# Patient Record
Sex: FEMALE
[De-identification: names, ages, dates, MRNs, and addresses within clinical notes are randomized per-mention and may not be internally consistent; named-entity substitution may affect disease eponyms.]

---

## 2019-01-11 ENCOUNTER — HOSPITAL ENCOUNTER (INPATIENT)
Dept: HOSPITAL 5 - ED | Age: 49
LOS: 2 days | Discharge: HOME | DRG: 343 | End: 2019-01-13
Attending: INTERNAL MEDICINE | Admitting: INTERNAL MEDICINE
Payer: COMMERCIAL

## 2019-01-11 DIAGNOSIS — K35.80: Primary | ICD-10-CM

## 2019-01-11 DIAGNOSIS — Z83.3: ICD-10-CM

## 2019-01-11 DIAGNOSIS — F10.10: ICD-10-CM

## 2019-01-11 DIAGNOSIS — Z79.84: ICD-10-CM

## 2019-01-11 DIAGNOSIS — E11.9: ICD-10-CM

## 2019-01-11 DIAGNOSIS — F17.210: ICD-10-CM

## 2019-01-11 DIAGNOSIS — Z71.6: ICD-10-CM
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
## 2019-01-11 LAB
ALBUMIN SERPL-MCNC: 4.6 G/DL (ref 3.9–5)
ALT SERPL-CCNC: 16 UNITS/L (ref 7–56)
BASOPHILS # (AUTO): 0.1 K/MM3 (ref 0–0.1)
BASOPHILS NFR BLD AUTO: 1 % (ref 0–1.8)
BILIRUB UR QL STRIP: (no result)
BLOOD UR QL VISUAL: (no result)
BUN SERPL-MCNC: 15 MG/DL (ref 7–17)
BUN/CREAT SERPL: 17 %
CALCIUM SERPL-MCNC: 10.1 MG/DL (ref 8.4–10.2)
EOSINOPHIL # BLD AUTO: 0.2 K/MM3 (ref 0–0.4)
EOSINOPHIL NFR BLD AUTO: 1.7 % (ref 0–4.3)
HCT VFR BLD CALC: 47.1 % (ref 30.3–42.9)
HEMOLYSIS INDEX: 5
HGB BLD-MCNC: 16.5 GM/DL (ref 10.1–14.3)
LYMPHOCYTES # BLD AUTO: 3.2 K/MM3 (ref 1.2–5.4)
LYMPHOCYTES NFR BLD AUTO: 32.9 % (ref 13.4–35)
MCHC RBC AUTO-ENTMCNC: 35 % (ref 30–34)
MCV RBC AUTO: 91 FL (ref 79–97)
MONOCYTES # (AUTO): 0.6 K/MM3 (ref 0–0.8)
MONOCYTES % (AUTO): 6.5 % (ref 0–7.3)
MUCOUS THREADS #/AREA URNS HPF: (no result) /HPF
PH UR STRIP: 8 [PH] (ref 5–7)
PLATELET # BLD: 255 K/MM3 (ref 140–440)
PROT UR STRIP-MCNC: (no result) MG/DL
RBC # BLD AUTO: 5.15 M/MM3 (ref 3.65–5.03)
RBC #/AREA URNS HPF: 4 /HPF (ref 0–6)
UROBILINOGEN UR-MCNC: 2 MG/DL (ref ?–2)
WBC #/AREA URNS HPF: 3 /HPF (ref 0–6)

## 2019-01-11 PROCEDURE — 88304 TISSUE EXAM BY PATHOLOGIST: CPT

## 2019-01-11 PROCEDURE — 85025 COMPLETE CBC W/AUTO DIFF WBC: CPT

## 2019-01-11 PROCEDURE — 96375 TX/PRO/DX INJ NEW DRUG ADDON: CPT

## 2019-01-11 PROCEDURE — 99406 BEHAV CHNG SMOKING 3-10 MIN: CPT

## 2019-01-11 PROCEDURE — 80053 COMPREHEN METABOLIC PANEL: CPT

## 2019-01-11 PROCEDURE — 80048 BASIC METABOLIC PNL TOTAL CA: CPT

## 2019-01-11 PROCEDURE — 96374 THER/PROPH/DIAG INJ IV PUSH: CPT

## 2019-01-11 PROCEDURE — 74177 CT ABD & PELVIS W/CONTRAST: CPT

## 2019-01-11 PROCEDURE — 82962 GLUCOSE BLOOD TEST: CPT

## 2019-01-11 PROCEDURE — 84703 CHORIONIC GONADOTROPIN ASSAY: CPT

## 2019-01-11 PROCEDURE — 81001 URINALYSIS AUTO W/SCOPE: CPT

## 2019-01-11 PROCEDURE — 36415 COLL VENOUS BLD VENIPUNCTURE: CPT

## 2019-01-11 PROCEDURE — 83690 ASSAY OF LIPASE: CPT

## 2019-01-11 NOTE — EMERGENCY DEPARTMENT REPORT
ED Abdominal Pain HPI





- General


Chief Complaint: Abdominal Pain


Stated Complaint: ABD PAIN/VOMITING


Time Seen by Provider: 01/11/19 22:01


Source: patient


Mode of arrival: Ambulatory


Limitations: No Limitations





- History of Present Illness


Initial Comments: 


Patient is a 48-year-old female that presents to emergency room with complaints 

of abdominal pain 3 days.  Patient states she's had this problem for 1 month 

and a half intermittently.  Patient states the pain is a 5 out of 10 and has 

been worsening over the last 3 days.  Patient states the pain is better with 

rest and worse with palpation and movement.  Patient states the pain is in her 

umbilicus and radiates to her right lower quadrant and radiates to her right 

flank.  Patient states she is having nausea and vomiting.  Patient states 

several normal bowel movements.





MD Complaint: abdominal pain


-: Sudden


Location: periumbilical, RLQ


Radiation: R flank


Migration to: no migration


Severity: moderate


Severity scale (0 -10): 5


Quality: stabbing


Consistency: constant


Improves With: rest


Worsens With: movement


Associated Symptoms: nausea, vomiting.  denies: diarrhea, fever, chills, 

constipation, dysuria, hematemesis, hematochezia, melena, hematuria, anorexia, 

syncope


Treatments Prior to Arrival: NSAIDs





- Related Data


LMP (females 10-50): last week


                                    Allergies











Allergy/AdvReac Type Severity Reaction Status Date / Time


 


No Known Allergies Allergy   Unverified 01/11/19 17:55














ED Review of Systems


ROS: 


Stated complaint: ABD PAIN/VOMITING


Other details as noted in HPI





Constitutional: denies: chills, fever


Eyes: denies: eye pain, eye discharge, vision change


ENT: denies: ear pain, throat pain


Respiratory: denies: cough, shortness of breath, wheezing


Cardiovascular: denies: chest pain, palpitations


Endocrine: no symptoms reported


Gastrointestinal: abdominal pain, nausea, vomiting.  denies: diarrhea


Genitourinary: denies: urgency, dysuria, discharge


Musculoskeletal: denies: back pain, joint swelling, arthralgia


Skin: denies: rash, lesions


Neurological: denies: headache, weakness, paresthesias


Psychiatric: denies: anxiety, depression


Hematological/Lymphatic: denies: easy bleeding, easy bruising





ED Past Medical Hx





- Past Medical History


Previous Medical History?: No





- Surgical History


Past Surgical History?: Yes


Additional Surgical History: C/S





- Family History


Family history: no significant





- Social History


Smoking Status: Current Every Day Smoker


Substance Use Type: None





ED Physical Exam





- General


Limitations: No Limitations


General appearance: alert, in no apparent distress





- Head


Head exam: Present: atraumatic, normocephalic





- Eye


Eye exam: Present: normal appearance





- ENT


ENT exam: Present: mucous membranes moist





- Neck


Neck exam: Present: normal inspection





- Respiratory


Respiratory exam: Present: normal lung sounds bilaterally.  Absent: respiratory 

distress





- Cardiovascular


Cardiovascular Exam: Present: regular rate, normal rhythm.  Absent: systolic 

murmur, diastolic murmur, rubs, gallop





- GI/Abdominal


GI/Abdominal exam: Present: soft, tenderness (periumbilical tenderness and right

lower quadrant tenderness.  Right flank tenderness), normal bowel sounds





- Extremities Exam


Extremities exam: Present: normal inspection





- Back Exam


Back exam: Present: normal inspection





- Neurological Exam


Neurological exam: Present: alert, oriented X3





- Psychiatric


Psychiatric exam: Present: normal affect, normal mood





- Skin


Skin exam: Present: warm, dry, intact, normal color.  Absent: rash





ED Course


                                   Vital Signs











  01/11/19 01/11/19 01/11/19





  17:55 21:38 23:55


 


Temperature 98.5 F  


 


Pulse Rate 74 74 62


 


Respiratory 20 16 16





Rate   


 


Blood Pressure 140/87  


 


Blood Pressure  134/62 126/81





[Left]   


 


O2 Sat by Pulse 98 97 97





Oximetry   














  01/12/19 01/12/19 01/12/19





  00:55 01:55 03:27


 


Temperature   97.8 F


 


Pulse Rate 61 67 61


 


Respiratory 18 16 16





Rate   


 


Blood Pressure   


 


Blood Pressure 106/53 121/74 121/76





[Left]   


 


O2 Sat by Pulse 98 98 97





Oximetry   














- Reevaluation(s)


Reevaluation #1: 


Discussed all results with patient. patient agrees with plan of care and 

admission.  Patient to be admitted to the hospitalist service.


01/12/19 03:15








- Consultations


Consultation #1: 








Discussed case with Dr. boucher.  Dr. boucher, general surgery wants patient 

admitted to the hospitalist service and placed on Zosyn and nothing by mouth


01/12/19 03:16








Hospitalist consult for admission.  Hospitalist to admit patient.


01/12/19 03:49














ED Medical Decision Making





- Lab Data


Result diagrams: 


                                 01/11/19 18:06





                                 01/11/19 18:06





- Radiology Data


Radiology results: report reviewed





FINAL REPORT 





 PROCEDURE: CT ABDOMEN PELVIS W CON 





 TECHNIQUE: Computerized axial tomography of the abdomen and pelvis was 

performed after the IV 


 injection of iodinated nonionic contrast. 





 HISTORY: rt flank pain. back pain,. rlq pain 





 COMPARISON: No prior studies are available for comparison. 





 FINDINGS: 


 Visualized lower thorax: No significant abnormality. 





 Liver: Normal size and attenuation. 





 Spleen: Normal size and attenuation. 





 Gallbladder and biliary system: Normal. 





 Pancreas: Normal. 





 Adrenals: Normal. 





 Kidneys: Normal. 





 GI tract: The appendix is identified and measures 7 millimeters in diameter. 

This is at the upper 


 limits of normal. There are questionable inflammatory changes of the 

surrounding fat. Early 


 appendicitis is suspected. Correlation with clinical data suggested. 





 The stomach, small bowel and colon are unremarkable.. 





 Lymph nodes and mesentery: Normal. 





 Vasculature: Normal. 





 Bladder: Normal. 





 Reproductive organs: Uterus and ovaries are unremarkable.. 





 Peritoneum: There is no ascites or free air, abscess or adenopathy.. 





 Musculoskeletal structures: No significant abnormality. 





 Other: None. 





 IMPRESSION: 


 The appendix is identified and measures 7 millimeters in diameter. This is at 

the upper limits of 


 normal. There are questionable inflammatory changes of the surrounding fat. 

Early appendicitis is 


 suspected. Correlation with clinical data suggested. 





 The stomach, small bowel and colon are unremarkable.. 





 Uterus and ovaries are unremarkable.. 





 There is no ascites or free air, abscess or adenopathy. 











- Medical Decision Making


Patient is a 48-year-old female that presents emergency room with complaints of 

right lower quadrant pain.  Patient found on CT scan to have a acute 

appendicitis.  Gen. surgery consult.  Patient admitted to the hospitalist 

service.








- Differential Diagnosis


appendicitis.  Abdominal pain


Critical Care Time: Yes


Critical care attestation.: 


If time is entered above; I have spent that time in minutes in the direct care 

of this critically ill patient, excluding procedure time.





Critical Care Time: 





35 minutes





ED Disposition


Clinical Impression: 


Appendicitis


Qualifiers:


 Appendicitis type: acute appendicitis Acute appendicitis type: unspecified 

acute appendicitis type Qualified Code(s): K35.80 - Unspecified acute 

appendicitis





Abdominal pain


Qualifiers:


 Abdominal location: right lower quadrant Qualified Code(s): R10.31 - Right lowe

r quadrant pain





Disposition: DC-09 OP ADMIT IP TO THIS HOSP


Is pt being admited?: Yes


Does the pt Need Aspirin: No


Condition: Critical


Time of Disposition: 03:46

## 2019-01-12 PROCEDURE — 0DTJ4ZZ RESECTION OF APPENDIX, PERCUTANEOUS ENDOSCOPIC APPROACH: ICD-10-PCS | Performed by: SURGERY

## 2019-01-12 RX ADMIN — SODIUM CHLORIDE SCH MLS/HR: 0.9 INJECTION, SOLUTION INTRAVENOUS at 17:28

## 2019-01-12 RX ADMIN — HYDROMORPHONE HYDROCHLORIDE PRN MG: 1 INJECTION, SOLUTION INTRAMUSCULAR; INTRAVENOUS; SUBCUTANEOUS at 14:45

## 2019-01-12 RX ADMIN — SODIUM CHLORIDE SCH MLS/HR: 0.9 INJECTION, SOLUTION INTRAVENOUS at 17:30

## 2019-01-12 RX ADMIN — HYDROMORPHONE HYDROCHLORIDE PRN MG: 1 INJECTION, SOLUTION INTRAMUSCULAR; INTRAVENOUS; SUBCUTANEOUS at 16:00

## 2019-01-12 RX ADMIN — HYDROCODONE BITARTRATE AND ACETAMINOPHEN PRN EACH: 5; 325 TABLET ORAL at 23:42

## 2019-01-12 NOTE — CONSULTATION
History of Present Illness


Consult date: 19


Chief complaint: 





ABDOMINAL PAIN





- History of present illness


History of present illness: 





47 yo F with hx of prediabetes presents to hospital with c/o 3 days of 

periumbilical abdominal pain radiating to the right lower quadrant and right 

lower back. Pain is sharp, started suddenly and is not specifically related to 

food. It waxes and wanes in severity but is otherwise constant. She had 3 

episodes of nonbloody/nonbilious emesis today. + loose stools. Low grade temp at

home of 100. She has never had pain like this in the past. She also c/o RUQ pain

from time to time after eating meals, especially at night after dinner. This 

pain is different from what she is experiencing today. She has never had a 

colonoscopy and has not seen a doctor in 5 years.





Past History


Past Medical History: other (prediabetes)


Past Surgical History: 


Social history: smoking (1/2 PPD), alcohol abuse (occasional).  denies: 

prescription drug abuse, IV drug use


Family history: diabetes





Medications and Allergies


                                    Allergies











Allergy/AdvReac Type Severity Reaction Status Date / Time


 


No Known Allergies Allergy   Unverified 19 17:55











Active Meds: 


Active Medications





Sodium Chloride (Nacl 0.9% 1000 Ml)  1,000 mls @ 100 mls/hr IV AS DIRECT EVETTE


Piperacillin Sod/Tazobactam Sod (Zosyn/Ns 4.5gm/100ml)  4.5 gm in 100 mls @ 200 

mls/hr IV Q8H EVETTE; Protocol











Review of Systems


All systems: negative (10 PT ROS performed and negative except for that listed 

in HPI)





Exam


                                   Vital Signs











Temp Pulse Resp BP Pulse Ox


 


 98.5 F   74   20   140/87   98 


 


 19 17:55  19 17:55  19 17:55  19 17:55  19 17:55











Narrative exam: 





Gen: AAOx3. NAD


ENT: no scleral icterus or conjunctival pallor


CV: S1, S2+


resp: even and unlabored


Abd: soft, ND, + RLQ and RUQ TTP. no r/r/g


Ext; no c/c/e





Results





- Labs





                                 19 18:06





                                 19 18:06


                              Abnormal lab results











  19 Range/Units





  18:06 18:06 18:29 


 


RBC  5.15 H    (3.65-5.03)  M/mm3


 


Hgb  16.5 H    (10.1-14.3)  gm/dl


 


Hct  47.1 H    (30.3-42.9)  %


 


MCHC  35 H    (30-34)  %


 


RDW  13.0 L    (13.2-15.2)  %


 


Glucose   209 H   ()  mg/dL


 


Urine pH    8.0 H  (5.0-7.0)  








                                 Diabetes panel











  19 Range/Units





  18:06 


 


Sodium  139  (137-145)  mmol/L


 


Potassium  4.1  (3.6-5.0)  mmol/L


 


Chloride  98.8  ()  mmol/L


 


Carbon Dioxide  29  (22-30)  mmol/L


 


BUN  15  (7-17)  mg/dL


 


Creatinine  0.9  (0.7-1.2)  mg/dL


 


Glucose  209 H  ()  mg/dL


 


Calcium  10.1  (8.4-10.2)  mg/dL


 


AST  14  (5-40)  units/L


 


ALT  16  (7-56)  units/L


 


Alkaline Phosphatase  121  ()  units/L


 


Total Protein  7.6  (6.3-8.2)  g/dL


 


Albumin  4.6  (3.9-5)  g/dL








                                  Calcium panel











  19 Range/Units





  18:06 


 


Calcium  10.1  (8.4-10.2)  mg/dL


 


Albumin  4.6  (3.9-5)  g/dL








                                 Pituitary panel











  19 Range/Units





  18:06 


 


Sodium  139  (137-145)  mmol/L


 


Potassium  4.1  (3.6-5.0)  mmol/L


 


Chloride  98.8  ()  mmol/L


 


Carbon Dioxide  29  (22-30)  mmol/L


 


BUN  15  (7-17)  mg/dL


 


Creatinine  0.9  (0.7-1.2)  mg/dL


 


Glucose  209 H  ()  mg/dL


 


Calcium  10.1  (8.4-10.2)  mg/dL








                                  Adrenal panel











  19 Range/Units





  18:06 


 


Sodium  139  (137-145)  mmol/L


 


Potassium  4.1  (3.6-5.0)  mmol/L


 


Chloride  98.8  ()  mmol/L


 


Carbon Dioxide  29  (22-30)  mmol/L


 


BUN  15  (7-17)  mg/dL


 


Creatinine  0.9  (0.7-1.2)  mg/dL


 


Glucose  209 H  ()  mg/dL


 


Calcium  10.1  (8.4-10.2)  mg/dL


 


Total Bilirubin  0.40  (0.1-1.2)  mg/dL


 


AST  14  (5-40)  units/L


 


ALT  16  (7-56)  units/L


 


Alkaline Phosphatase  121  ()  units/L


 


Total Protein  7.6  (6.3-8.2)  g/dL


 


Albumin  4.6  (3.9-5)  g/dL














- Imaging


CT scan - abdomen: report reviewed, image reviewed





Assessment and Plan





47 yo F with acute appendicitis





PLan:





1. NPO


2. IVF


3. IV zosyn


4. prn pain and nausea control


5. for OR today for appendectomy. I discussed all risks, benefits, and 

alternatives to surgery with patient and all questions answered. Consent 

obtained. 


6. The patient has also been having intermittent RUQ abd pain after meals. Will 

likely need gallbladder w/u as outpatient





Thank you, please call with questions

## 2019-01-12 NOTE — ANESTHESIA CONSULTATION
Anesthesia Consult and Med Hx


Date of service: 01/12/19





- Airway


Anesthetic Teeth Evaluation: Poor


ROM Head & Neck: Adequate


Mental/Hyoid Distance: Adequate


Mallampati Class: Class I


Intubation Access Assessment: Probably Good





- Pulmonary Exam


CTA: Yes





- Cardiac Exam


Cardiac Exam: RRR





- Pre-Operative Health Status


ASA Pre-Surgery Classification: ASA2


Proposed Anesthetic Plan: General





- Pulmonary


Hx Smoking: Yes (1/2 ppd > 20 years)


Hx Asthma: No


Hx Respiratory Symptoms: No


SOB: No


Hx Sleep Apnea: No





- Cardiovascular System


Hx Hypertension: No


Hx Cardia Arrhythmia: No





- Central Nervous System


Hx Neuromuscular Disorder: No


Hx Seizures: No





- Gastrointestinal


Hx Gastroesophageal Reflux Disease: No





- Endocrine


Hx Renal Disease: No


Hx Non-Insulin Dependent Diabetes: Yes ( g/dl)





- Other Systems


Hx Cancer: No





- Additional Comments


Anesthesia Medical History Comments: No GAC, No FHAC

## 2019-01-12 NOTE — HISTORY AND PHYSICAL REPORT
History of Present Illness


Date of examination: 01/12/19


Date of admission: 


01/12/19 05:52





Chief complaint: 





Abdominal pain


History of present illness: 





Patient is a 48-year-old female that presents to emergency room with complaints 

of abdominal pain 3 days.  Patient states she's had this problem for 1 month 

and a half intermittently and believes that he has been going on as long as a 

year.  Patient states the pain is a 5 out of 10 and has been worsening over the 

last 3 days.  Patient states the pain is better with rest and worse with 

palpation and movement.  Patient states the pain is in her umbilicus and 

radiates to her right lower quadrant and radiates to her right flank.  Patient 

states she is having nausea and vomiting.  Patient states several normal bowel 

movements.  She denies any fever or chills.  No cough or cold-like symptoms.  No

diarrhea.





Past History


Past Medical History: No medical history


Past Surgical History: No surgical history


Social history: smoking


Family history: no significant family history





Medications and Allergies


                                    Allergies











Allergy/AdvReac Type Severity Reaction Status Date / Time


 


No Known Allergies Allergy   Unverified 01/11/19 17:55











Active Meds: 


Active Medications





Sodium Chloride (Nacl 0.9% 1000 Ml)  1,000 mls @ 100 mls/hr IV AS DIRECT EVETTE


Piperacillin Sod/Tazobactam Sod (Zosyn/Ns 4.5gm/100ml)  4.5 gm in 100 mls @ 200 

mls/hr IV Q8H EVETTE; Protocol











Review of Systems


All systems: negative





Exam





- Constitutional


Vitals: 


                                        











Temp Pulse Resp BP Pulse Ox


 


 98.0 F   57 L  18   122/62   93 


 


 01/12/19 11:50  01/12/19 11:50  01/12/19 11:50  01/12/19 11:50  01/12/19 11:50











General appearance: Present: no acute distress, well-nourished





- EENT


Eyes: Present: PERRL


ENT: hearing intact, clear oral mucosa





- Neck


Neck: Present: supple, normal ROM





- Respiratory


Respiratory effort: normal


Respiratory: bilateral: CTA





- Cardiovascular


Heart Sounds: Present: S1 & S2.  Absent: rub, click





- Extremities


Extremities: pulses symmetrical, No edema


Peripheral Pulses: within normal limits





- Abdominal


General gastrointestinal: Present: soft, non-tender, non-distended, normal bowel

sounds


Female genitourinary: Present: normal





- Integumentary


Integumentary: Present: clear, warm, dry





- Musculoskeletal


Musculoskeletal: gait normal, strength equal bilaterally





- Psychiatric


Psychiatric: appropriate mood/affect, intact judgment & insight





- Neurologic


Neurologic: CNII-XII intact, moves all extremities





Results





- Labs


CBC & Chem 7: 


                                 01/11/19 18:06





                                 01/11/19 18:06


Labs: 


                             Laboratory Last Values











WBC  9.6 K/mm3 (4.5-11.0)   01/11/19  18:06    


 


RBC  5.15 M/mm3 (3.65-5.03)  H  01/11/19  18:06    


 


Hgb  16.5 gm/dl (10.1-14.3)  H  01/11/19  18:06    


 


Hct  47.1 % (30.3-42.9)  H  01/11/19  18:06    


 


MCV  91 fl (79-97)   01/11/19  18:06    


 


MCH  32 pg (28-32)   01/11/19  18:06    


 


MCHC  35 % (30-34)  H  01/11/19  18:06    


 


RDW  13.0 % (13.2-15.2)  L  01/11/19  18:06    


 


Plt Count  255 K/mm3 (140-440)   01/11/19  18:06    


 


Lymph % (Auto)  32.9 % (13.4-35.0)   01/11/19  18:06    


 


Mono % (Auto)  6.5 % (0.0-7.3)   01/11/19  18:06    


 


Eos % (Auto)  1.7 % (0.0-4.3)   01/11/19  18:06    


 


Baso % (Auto)  1.0 % (0.0-1.8)   01/11/19  18:06    


 


Lymph #  3.2 K/mm3 (1.2-5.4)   01/11/19  18:06    


 


Mono #  0.6 K/mm3 (0.0-0.8)   01/11/19  18:06    


 


Eos #  0.2 K/mm3 (0.0-0.4)   01/11/19  18:06    


 


Baso #  0.1 K/mm3 (0.0-0.1)   01/11/19  18:06    


 


Seg Neutrophils %  57.9 % (40.0-70.0)   01/11/19  18:06    


 


Seg Neutrophils #  5.6 K/mm3 (1.8-7.7)   01/11/19  18:06    


 


Sodium  139 mmol/L (137-145)   01/11/19  18:06    


 


Potassium  4.1 mmol/L (3.6-5.0)   01/11/19  18:06    


 


Chloride  98.8 mmol/L ()   01/11/19  18:06    


 


Carbon Dioxide  29 mmol/L (22-30)   01/11/19  18:06    


 


Anion Gap  15 mmol/L  01/11/19  18:06    


 


BUN  15 mg/dL (7-17)   01/11/19  18:06    


 


Creatinine  0.9 mg/dL (0.7-1.2)   01/11/19  18:06    


 


Estimated GFR  > 60 ml/min  01/11/19  18:06    


 


BUN/Creatinine Ratio  17 %  01/11/19  18:06    


 


Glucose  209 mg/dL ()  H  01/11/19  18:06    


 


Calcium  10.1 mg/dL (8.4-10.2)   01/11/19  18:06    


 


Total Bilirubin  0.40 mg/dL (0.1-1.2)   01/11/19  18:06    


 


AST  14 units/L (5-40)   01/11/19  18:06    


 


ALT  16 units/L (7-56)   01/11/19  18:06    


 


Alkaline Phosphatase  121 units/L ()   01/11/19  18:06    


 


Total Protein  7.6 g/dL (6.3-8.2)   01/11/19  18:06    


 


Albumin  4.6 g/dL (3.9-5)   01/11/19  18:06    


 


Albumin/Globulin Ratio  1.5 %  01/11/19  18:06    


 


Lipase  30 units/L (13-60)   01/11/19  18:06    


 


HCG, Qual  Negative  (Negative)   01/11/19  18:06    


 


Urine Color  Yellow  (Yellow)   01/11/19  18:29    


 


Urine Turbidity  Clear  (Clear)   01/11/19  18:29    


 


Urine pH  8.0  (5.0-7.0)  H  01/11/19  18:29    


 


Ur Specific Gravity  1.018  (1.003-1.030)   01/11/19  18:29    


 


Urine Protein  <15 mg/dl mg/dL (Negative)   01/11/19  18:29    


 


Urine Glucose (UA)  50 mg/dL (Negative)   01/11/19 18:29    


 


Urine Ketones  Neg mg/dL (Negative)   01/11/19 18:29    


 


Urine Blood  Neg  (Negative)   01/11/19  18:29    


 


Urine Nitrite  Neg  (Negative)   01/11/19  18:29    


 


Urine Bilirubin  Neg  (Negative)   01/11/19 18:29    


 


Urine Urobilinogen  2.0 mg/dL (<2.0)   01/11/19  18:29    


 


Ur Leukocyte Esterase  Sm  (Negative)   01/11/19  18:29    


 


Urine WBC (Auto)  3.0 /HPF (0.0-6.0)   01/11/19 18:29    


 


Urine RBC (Auto)  4.0 /HPF (0.0-6.0)   01/11/19 18:29    


 


U Epithel Cells (Auto)  3.0 /HPF (0-13.0)   01/11/19 18:29    


 


Urine Mucus  Few /HPF  01/11/19 18:29    














Assessment and Plan


Assessment and plan: 





Acute appendicitis.  The patient will Nothing by mouth and continue with 

supportive care with IV fluid hydration, pain control and anti-emetics.  Surgery

has evaluated the patient and plans for OR today for appendectomy.  Continue IV 

Zosyn.

## 2019-01-12 NOTE — CAT SCAN REPORT
FINAL REPORT



PROCEDURE:  CT ABDOMEN PELVIS W CON



TECHNIQUE:  Computerized axial tomography of the abdomen and pelvis was performed after the IV inject
ion of iodinated nonionic contrast. 



HISTORY:  rt flank pain. back pain,. rlq pain 



COMPARISON:  No prior studies are available for comparison.



FINDINGS:  

Visualized lower thorax: No significant abnormality.



Liver: Normal size and attenuation.



Spleen: Normal size and attenuation.



Gallbladder and biliary system: Normal.



Pancreas: Normal.



Adrenals: Normal.



Kidneys: Normal.



GI tract: The appendix is identified and measures 7 millimeters in diameter. This is at the upper hernandez
its of normal. There are questionable inflammatory changes of the surrounding fat. Early appendicitis
 is suspected. Correlation with clinical data suggested.



The stomach, small bowel and colon are unremarkable..



Lymph nodes and mesentery: Normal. 



Vasculature: Normal.



Bladder: Normal.



Reproductive organs: Uterus and ovaries are unremarkable..



Peritoneum: There is no ascites or free air, abscess or adenopathy..



Musculoskeletal structures: No significant abnormality.



Other: None.  



IMPRESSION:  

The appendix is identified and measures 7 millimeters in diameter. This is at the upper limits of nor
mal. There are questionable inflammatory changes of the surrounding fat. Early appendicitis is suspec
spring. Correlation with clinical data suggested.



The stomach, small bowel and colon are unremarkable..



Uterus and ovaries are unremarkable..



There is no ascites or free air, abscess or adenopathy.

## 2019-01-13 VITALS — SYSTOLIC BLOOD PRESSURE: 118 MMHG | DIASTOLIC BLOOD PRESSURE: 54 MMHG

## 2019-01-13 LAB
BASOPHILS # (AUTO): 0 K/MM3 (ref 0–0.1)
BASOPHILS NFR BLD AUTO: 0.5 % (ref 0–1.8)
BUN SERPL-MCNC: 9 MG/DL (ref 7–17)
BUN/CREAT SERPL: 13 %
CALCIUM SERPL-MCNC: 8.5 MG/DL (ref 8.4–10.2)
EOSINOPHIL # BLD AUTO: 0.1 K/MM3 (ref 0–0.4)
EOSINOPHIL NFR BLD AUTO: 1.2 % (ref 0–4.3)
HCT VFR BLD CALC: 39.6 % (ref 30.3–42.9)
HEMOLYSIS INDEX: 11
HGB BLD-MCNC: 13.6 GM/DL (ref 10.1–14.3)
LYMPHOCYTES # BLD AUTO: 3.2 K/MM3 (ref 1.2–5.4)
LYMPHOCYTES NFR BLD AUTO: 34.4 % (ref 13.4–35)
MCHC RBC AUTO-ENTMCNC: 34 % (ref 30–34)
MCV RBC AUTO: 93 FL (ref 79–97)
MONOCYTES # (AUTO): 0.6 K/MM3 (ref 0–0.8)
MONOCYTES % (AUTO): 6.4 % (ref 0–7.3)
PLATELET # BLD: 202 K/MM3 (ref 140–440)
RBC # BLD AUTO: 4.27 M/MM3 (ref 3.65–5.03)

## 2019-01-13 RX ADMIN — HYDROCODONE BITARTRATE AND ACETAMINOPHEN PRN EACH: 5; 325 TABLET ORAL at 12:43

## 2019-01-13 RX ADMIN — SODIUM CHLORIDE SCH MLS/HR: 0.9 INJECTION, SOLUTION INTRAVENOUS at 03:05

## 2019-01-13 NOTE — PROGRESS NOTE
Assessment and Plan





49 yo F s/p laparoscopic appendectomy, POD 1





1. low fat diet


2. dc IVF


3. prn PO pain control


4. OOB/ambulate


5. ok to dc home from surgery standpoint. Paper dc instructions left on chart. 

Patient to follow up in surgery office in 2 weeks





D/W Dr. Olman Meade, please call with questions





Subjective


Date of service: 01/13/19


Narrative: 





Pt seen and examined. c/o mild incisional pain. Pain from preop is gone. No f/c.

No cp, sob. Tolerating diet without n/v





Objective


                               Vital Signs - 12hr











  01/13/19 01/13/19 01/13/19





  00:25 04:46 04:47


 


Temperature 98.2 F 98.4 F 


 


Pulse Rate 73 69 64


 


Respiratory 18 18 





Rate   


 


Blood Pressure  100/55 


 


Blood Pressure 103/40  





[Left]   


 


O2 Sat by Pulse 92 91 91





Oximetry   














  01/13/19 01/13/19





  04:53 07:09


 


Temperature  98.8 F


 


Pulse Rate  61


 


Respiratory  16





Rate  


 


Blood Pressure 103/56 106/59


 


Blood Pressure  





[Left]  


 


O2 Sat by Pulse  93





Oximetry  














- General physical appearance


Narrative Exam: 





Gen: AAOx3. NAD


CV: s1, s2+


resp: even and unlabored


Abd: soft, ND, mild david incisional TTP. Incisions c/d/i. No r/r/g


Ext: no c/c/e





- Labs





                                 01/13/19 05:24





                                 01/13/19 05:24


                                 Diabetes panel











  01/13/19 Range/Units





  05:24 


 


Sodium  138  (137-145)  mmol/L


 


Potassium  3.8  (3.6-5.0)  mmol/L


 


Chloride  101.7  ()  mmol/L


 


Carbon Dioxide  24  (22-30)  mmol/L


 


BUN  9  (7-17)  mg/dL


 


Creatinine  0.7  (0.7-1.2)  mg/dL


 


Glucose  218 H  ()  mg/dL


 


Calcium  8.5  D  (8.4-10.2)  mg/dL








                                  Calcium panel











  01/13/19 Range/Units





  05:24 


 


Calcium  8.5  D  (8.4-10.2)  mg/dL








                                 Pituitary panel











  01/13/19 Range/Units





  05:24 


 


Sodium  138  (137-145)  mmol/L


 


Potassium  3.8  (3.6-5.0)  mmol/L


 


Chloride  101.7  ()  mmol/L


 


Carbon Dioxide  24  (22-30)  mmol/L


 


BUN  9  (7-17)  mg/dL


 


Creatinine  0.7  (0.7-1.2)  mg/dL


 


Glucose  218 H  ()  mg/dL


 


Calcium  8.5  D  (8.4-10.2)  mg/dL








                                  Adrenal panel











  01/13/19 Range/Units





  05:24 


 


Sodium  138  (137-145)  mmol/L


 


Potassium  3.8  (3.6-5.0)  mmol/L


 


Chloride  101.7  ()  mmol/L


 


Carbon Dioxide  24  (22-30)  mmol/L


 


BUN  9  (7-17)  mg/dL


 


Creatinine  0.7  (0.7-1.2)  mg/dL


 


Glucose  218 H  ()  mg/dL


 


Calcium  8.5  D  (8.4-10.2)  mg/dL

## 2019-01-13 NOTE — PROGRESS NOTE
Assessment and Plan


Assessment and plan: 





Acute appendicitis.  s/p appendectomy.  Patient noted to have dilated and 

inflamed appendix.  Continue with IV fluid hydration, pain control and anti-

emetics.  Continue diet as tolerated.  Continue IV Zosyn.





History


Interval history: 





Patient admitted with appendicitis and status post appendectomy.  Patient will 

well postoperatively.  Patient tolerating diet with no complaints.





Hospitalist Physical





- Constitutional


Vitals: 


                                        











Temp Pulse Resp BP Pulse Ox


 


 98.8 F   61   16   106/59   93 


 


 01/13/19 07:09  01/13/19 07:09  01/13/19 07:09  01/13/19 07:09  01/13/19 07:09











General appearance: Present: no acute distress, well-nourished





- EENT


Eyes: Present: PERRL, EOM intact


ENT: hearing intact, clear oral mucosa, dentition normal





- Neck


Neck: Present: supple, normal ROM





- Respiratory


Respiratory effort: normal


Respiratory: bilateral: CTA





- Cardiovascular


Rhythm: regular


Heart Sounds: Present: S1 & S2.  Absent: gallop, rub





- Extremities


Extremities: no ischemia, No edema, Full ROM





- Abdominal


General gastrointestinal: soft, non-tender, non-distended, normal bowel sounds





- Integumentary


Integumentary: Present: clear, warm, dry





- Neurologic


Neurologic: CNII-XII intact, moves all extremities





Results





- Labs


CBC & Chem 7: 


                                 01/13/19 05:24





                                 01/13/19 05:24


Labs: 


                             Laboratory Last Values











WBC  9.2 K/mm3 (4.5-11.0)   01/13/19  05:24    


 


RBC  4.27 M/mm3 (3.65-5.03)   01/13/19  05:24    


 


Hgb  13.6 gm/dl (10.1-14.3)   01/13/19  05:24    


 


Hct  39.6 % (30.3-42.9)  D 01/13/19  05:24    


 


MCV  93 fl (79-97)   01/13/19  05:24    


 


MCH  32 pg (28-32)   01/13/19  05:24    


 


MCHC  34 % (30-34)   01/13/19  05:24    


 


RDW  13.2 % (13.2-15.2)   01/13/19  05:24    


 


Plt Count  202 K/mm3 (140-440)   01/13/19  05:24    


 


Lymph % (Auto)  34.4 % (13.4-35.0)   01/13/19  05:24    


 


Mono % (Auto)  6.4 % (0.0-7.3)   01/13/19  05:24    


 


Eos % (Auto)  1.2 % (0.0-4.3)   01/13/19  05:24    


 


Baso % (Auto)  0.5 % (0.0-1.8)   01/13/19  05:24    


 


Lymph #  3.2 K/mm3 (1.2-5.4)   01/13/19  05:24    


 


Mono #  0.6 K/mm3 (0.0-0.8)   01/13/19  05:24    


 


Eos #  0.1 K/mm3 (0.0-0.4)   01/13/19  05:24    


 


Baso #  0.0 K/mm3 (0.0-0.1)   01/13/19  05:24    


 


Seg Neutrophils %  57.5 % (40.0-70.0)   01/13/19  05:24    


 


Seg Neutrophils #  5.3 K/mm3 (1.8-7.7)   01/13/19  05:24    


 


Sodium  138 mmol/L (137-145)   01/13/19  05:24    


 


Potassium  3.8 mmol/L (3.6-5.0)   01/13/19  05:24    


 


Chloride  101.7 mmol/L ()   01/13/19  05:24    


 


Carbon Dioxide  24 mmol/L (22-30)   01/13/19  05:24    


 


Anion Gap  16 mmol/L  01/13/19  05:24    


 


BUN  9 mg/dL (7-17)   01/13/19  05:24    


 


Creatinine  0.7 mg/dL (0.7-1.2)   01/13/19  05:24    


 


Estimated GFR  > 60 ml/min  01/13/19  05:24    


 


BUN/Creatinine Ratio  13 %  01/13/19  05:24    


 


Glucose  218 mg/dL ()  H  01/13/19  05:24    


 


POC Glucose  195  ()  H  01/12/19  14:14    


 


Calcium  8.5 mg/dL (8.4-10.2)  D 01/13/19  05:24    


 


Total Bilirubin  0.40 mg/dL (0.1-1.2)   01/11/19  18:06    


 


AST  14 units/L (5-40)   01/11/19  18:06    


 


ALT  16 units/L (7-56)   01/11/19  18:06    


 


Alkaline Phosphatase  121 units/L ()   01/11/19  18:06    


 


Total Protein  7.6 g/dL (6.3-8.2)   01/11/19  18:06    


 


Albumin  4.6 g/dL (3.9-5)   01/11/19  18:06    


 


Albumin/Globulin Ratio  1.5 %  01/11/19  18:06    


 


Lipase  30 units/L (13-60)   01/11/19  18:06    


 


HCG, Qual  Negative  (Negative)   01/11/19  18:06    


 


Urine Color  Yellow  (Yellow)   01/11/19  18:29    


 


Urine Turbidity  Clear  (Clear)   01/11/19  18:29    


 


Urine pH  8.0  (5.0-7.0)  H  01/11/19  18:29    


 


Ur Specific Gravity  1.018  (1.003-1.030)   01/11/19  18:29    


 


Urine Protein  <15 mg/dl mg/dL (Negative)   01/11/19  18:29    


 


Urine Glucose (UA)  50 mg/dL (Negative)   01/11/19  18:29    


 


Urine Ketones  Neg mg/dL (Negative)   01/11/19  18:29    


 


Urine Blood  Neg  (Negative)   01/11/19  18:29    


 


Urine Nitrite  Neg  (Negative)   01/11/19  18:29    


 


Urine Bilirubin  Neg  (Negative)   01/11/19  18:29    


 


Urine Urobilinogen  2.0 mg/dL (<2.0)   01/11/19  18:29    


 


Ur Leukocyte Esterase  Sm  (Negative)   01/11/19  18:29    


 


Urine WBC (Auto)  3.0 /HPF (0.0-6.0)   01/11/19  18:29    


 


Urine RBC (Auto)  4.0 /HPF (0.0-6.0)   01/11/19  18:29    


 


U Epithel Cells (Auto)  3.0 /HPF (0-13.0)   01/11/19  18:29    


 


Urine Mucus  Few /HPF  01/11/19  18:29

## 2019-01-14 NOTE — OPERATIVE REPORT
PREOPERATIVE DIAGNOSIS:  Acute appendicitis.



POSTOPERATIVE DIAGNOSIS:  Acute appendicitis.



FINDINGS:  Dilated, inflamed appendix.



PROCEDURE:  Laparoscopic appendectomy.



ANESTHESIA:  General endotracheal anesthesia, local.



SURGEON:  Reena Larios DO



ESTIMATED BLOOD LOSS:  Minimal.



PATHOLOGY:  Appendix.



DISPOSITION OF SPECIMEN:  To lab.



CONDITION:  Stable.



DISPOSITION OF PATIENT:  To PACU.



HISTORY OF PRESENT ILLNESS AND INDICATION:  The patient is a 48-year-old female,

who presented to the Emergency Room with complaints of periumbilical and right

lower quadrant abdominal pain radiating to the right lower back.  Pain had been

present for 3 days.  CT scan of the abdomen and pelvis showed slight dilatation

of the appendix with a mild periappendiceal inflammatory changes, concerning for

appendicitis.  Physical exam was consistent with appendicitis along with imaging

studies and therefore appendectomy was recommended.  All risks, benefits,

alternatives to surgery were discussed with the patient and questions answered. 

Consent was obtained.



PROCEDURE IN DETAIL:  The patient was identified in the preoperative area and

taken back to the operating room and placed on the operating table in supine

position.  After anesthesia was induced, a Cheung catheter was sterilely placed

by the circulating nurse.  The abdomen was then prepped and draped in the usual

sterile fashion.  Time-out was performed.  Local anesthetic was infiltrated to

all skin incision sites.  A 5 mm supraumbilical incision was made through which

a Veress needle was inserted.  Multiple attempts were made; however, the Veress

needle did not traverse the peritoneum due to the patient's body habitus and

therefore a small nick incision was made in the left upper quadrant at Lyles's

point.  The Veress needle was inserted through here and the position of the

Veress needle was confirmed using the saline drop test.  The abdomen was

insufflated to 15 mmHg and a 5 mm Optiview trocar was then placed through the

supraumbilical incision under direct visualization.  The abdomen was then

inspected along with a Veress needle tract and there was no underlying injury to

any of the abdominal contents.  The Veress needle was withdrawn under direct

visualization.  The patient was placed in Trendelenburg position and rotated to

the left.  An additional 5 mm suprapubic and a 12 mm left lower quadrant trocar

were placed under direct visualization.  The ileocecal fat pad was identified

and the cecum was rotated medially in order to identify the appendix.  The

appendix was identified and did appear thickened and inflamed.  The mesentery of

the appendix was ligated using a Harmonic scalpel.  Once the base of the

appendix was reached this, the appendix was transected using a [place] Flex 45

mm white load stapler.  The appendix was placed into the EndoCatch bag and

placed in the left lower quadrant.  The staple line as well as the ligated

mesentery was inspected for hemostasis and hemostasis carefully ensured.  The

patient was then placed back into neutral position and the appendix removed via

the 12 mm left lower quadrant port.  The left lower quadrant port fascia was

closed with 0 Vicryl interrupted stitch using the Jordan-Lida device.  All

skin incisions were once again infiltrated with local anesthetic and the skin

incisions were closed with 4-0 Monocryl subcuticular stitches and skin glue. 

The Cheung catheter was removed.



At the end of the case, all sponge, instrument and sharp counts were correct x

2.  The patient was awoken from anesthesia, extubated, and taken to PACU in

stable condition.





DD: 01/14/2019 10:02

DT: 01/14/2019 12:17

JOB# [id number]  [id number]

SELINA/SRIDEVI

## 2019-01-14 NOTE — DISCHARGE SUMMARY
Providers





- Providers


Date of Admission: 


01/12/19 05:52





Date of discharge: 01/13/19


Attending physician: 


MARCIANO DOTSON





                                        





01/12/19 05:58


Consult to Physician [CONS] Routine 


   Comment: DR MEDINA NOTIFIED [id number]


   Consulting Provider: ADAN MEDINA


   Physician Instructions: 


   Reason For Exam: api











Primary care physician: 


PRIMARY CARE MD








Hospitalization


Reason for admission: appendicitis


Condition: Critical


Hospital course: 





48-year-old female who presented through the emergency department with chief 

complaint of abdominal pain 3 days.  The patient underwent CT scan of the 

abdomen and pelvis that revealed appendicitis.  Patient was initially treated 

with IV fluid hydration, IV antibiotics and pain control.  Patient was evaluated

 by surgery and underwent laparoscopic Appendectomy.  The patient tolerated the 

procedure well.  Diet was advanced which the patient tolerated.  Patient 

ambulated well and pain was controlled with by mouth medications.  Therefore, 

patient was felt to have received maximal hospital benefit.  Dedicated discharge

 time 32 minutes.


Disposition: DC-01 TO HOME OR SELFCARE


Time spent for discharge: 32





- Discharge Diagnoses


(1) Abdominal pain


Status: Acute   


Qualifiers: 


   Abdominal location: right lower quadrant   Qualified Code(s): R10.31 - Right 

lower quadrant pain   





(2) Appendicitis


Status: Acute   


Qualifiers: 


   Appendicitis type: acute appendicitis   Acute appendicitis type: unspecified 

acute appendicitis type   Qualified Code(s): K35.80 - Unspecified acute 

appendicitis   





Core Measure Documentation





- Palliative Care


Palliative Care/ Comfort Measures: Not Applicable





- Core Measures


Any of the following diagnoses?: none





Exam





- Constitutional


Vitals: 


                                        











Temp Pulse Resp BP Pulse Ox


 


 98.5 F   60   16   118/54   94 


 


 01/13/19 15:29  01/13/19 15:29  01/13/19 15:29  01/13/19 15:29  01/13/19 15:29











General appearance: Present: no acute distress, well-nourished





- EENT


Eyes: Present: PERRL


ENT: hearing intact, clear oral mucosa





- Neck


Neck: Present: supple, normal ROM





- Respiratory


Respiratory effort: normal


Respiratory: bilateral: CTA





- Cardiovascular


Heart Sounds: Present: S1 & S2.  Absent: rub, click





- Extremities


Extremities: pulses symmetrical, No edema


Peripheral Pulses: within normal limits





- Abdominal


General gastrointestinal: Present: soft, non-tender, non-distended, normal bowel

 sounds


Female genitourinary: Present: normal





- Integumentary


Integumentary: Present: clear, warm, dry





- Musculoskeletal


Musculoskeletal: gait normal, strength equal bilaterally





- Psychiatric


Psychiatric: appropriate mood/affect, intact judgment & insight





- Neurologic


Neurologic: CNII-XII intact, moves all extremities





Plan


Activity: advance as tolerated


Weight Bearing Status: Weight Bear as Tolerated


Diet: regular


Wound: per your surgeon's advice


Prescriptions: 


HYDROcodone/APAP 5-325 [[place] 5/325] 1 each PO Q4HR PRN #20 tablet


 PRN Reason: Pain

## 2019-12-29 NOTE — POST OPERATIVE NOTE
Date of procedure: 01/12/19


Pre-op diagnosis: acute appendicitis


Post-op diagnosis: same


Findings: 





dilated and inflamed appendix


Procedure: 





laparoscopic appendectomy


Anesthesia: GETA, local


Surgeon: ADAN MEDINA


Estimated blood loss: minimal


Pathology: list (appendix)


Specimen disposition: to lab


Condition: stable


Disposition: PACU
Patient